# Patient Record
Sex: FEMALE | Race: WHITE | NOT HISPANIC OR LATINO | Employment: PART TIME | URBAN - METROPOLITAN AREA
[De-identification: names, ages, dates, MRNs, and addresses within clinical notes are randomized per-mention and may not be internally consistent; named-entity substitution may affect disease eponyms.]

---

## 2020-06-03 ENCOUNTER — OFFICE VISIT (OUTPATIENT)
Dept: URGENT CARE | Facility: PHYSICIAN GROUP | Age: 20
End: 2020-06-03
Payer: COMMERCIAL

## 2020-06-03 VITALS
HEIGHT: 65 IN | DIASTOLIC BLOOD PRESSURE: 64 MMHG | WEIGHT: 116 LBS | BODY MASS INDEX: 19.33 KG/M2 | HEART RATE: 80 BPM | SYSTOLIC BLOOD PRESSURE: 100 MMHG | OXYGEN SATURATION: 100 % | TEMPERATURE: 97 F

## 2020-06-03 DIAGNOSIS — N89.8 VAGINAL ITCHING: Primary | ICD-10-CM

## 2020-06-03 DIAGNOSIS — R10.2 PAIN IN VULVA: ICD-10-CM

## 2020-06-03 DIAGNOSIS — N94.10 DYSPAREUNIA IN FEMALE: ICD-10-CM

## 2020-06-03 PROCEDURE — 99203 OFFICE O/P NEW LOW 30 MIN: CPT | Performed by: PHYSICIAN ASSISTANT

## 2020-06-03 RX ORDER — ALPRAZOLAM 0.25 MG/1
0.25 TABLET ORAL NIGHTLY PRN
COMMUNITY

## 2020-06-03 RX ORDER — CLOBETASOL PROPIONATE 0.5 MG/G
CREAM TOPICAL
Qty: 1 TUBE | Refills: 0 | Status: SHIPPED | OUTPATIENT
Start: 2020-06-03 | End: 2020-09-17

## 2020-06-03 RX ORDER — FLUCONAZOLE 150 MG/1
150 TABLET ORAL DAILY
Qty: 1 TAB | Refills: 0 | Status: SHIPPED | OUTPATIENT
Start: 2020-06-03 | End: 2020-09-17

## 2020-06-03 ASSESSMENT — ENCOUNTER SYMPTOMS
DIARRHEA: 0
CONSTIPATION: 0
FLANK PAIN: 0
VAGINITIS: 1
NAUSEA: 0
CHILLS: 0
ANOREXIA: 0
ABDOMINAL PAIN: 0
FEVER: 0
BACK PAIN: 0
SORE THROAT: 0

## 2020-06-03 NOTE — PROGRESS NOTES
"Subjective:      Leeann Kamara is a 19 y.o. female who presents with Vaginal Swelling (possibly from a shaving laceration that happened in January. swelling started this morning)    Medications:    • ALPRAZolam Tabs    Allergies: Amoxicillin and Penicillins    Problem List: Leeann Kamara does not have a problem list on file.    Surgical History:  No past surgical history on file.    Past Social Hx: Leeann Kamara       Past Family Hx:  Leeann Kamara FHx not pertinent to today's complaint.       Problem list, medications, and allergies reviewed by myself today in Epic.      Patient presents with:  Vaginal Swelling, itching and dryness for \" a while\"  possibly from a shaving laceration that happened in January. Right labial swelling started this morning.  Pt states she is sexually active though not recently (last time was 3-4 weeks ago). Pt states right labia has been sore or \"not healed\" for 5 months, but denies scabs, bleeding, discharge, drainage.  PT states sex has always been painful/dry and taking OCP has made it worse so she is not currently taking them.  Pt denies any other complaint.         Vaginitis   The patient's primary symptoms include genital itching. The patient's pertinent negatives include no genital odor, genital rash, missed menses, pelvic pain, vaginal bleeding or vaginal discharge (not more than normal). This is a new problem. The current episode started more than 1 month ago. The problem occurs daily. The problem has been waxing and waning. The pain is moderate. The problem affects the right side. She is not pregnant. Associated symptoms include painful intercourse. Pertinent negatives include no abdominal pain, anorexia, back pain, chills, constipation, diarrhea, discolored urine, dysuria, fever, flank pain, frequency, hematuria, nausea, rash, sore throat or urgency. The symptoms are aggravated by intercourse and tactile pressure. She has tried nothing for the symptoms. The " "treatment provided no relief. She is sexually active. No, her partner does not have an STD. She uses condoms for contraception. Her menstrual history has been regular. Her past medical history is significant for ovarian cysts and vaginosis. There is no history of a gynecological surgery, herpes simplex, menorrhagia, metrorrhagia, PID or an STD.       Review of Systems   Constitutional: Negative for chills and fever.   HENT: Negative for sore throat.    Gastrointestinal: Negative for abdominal pain, anorexia, constipation, diarrhea and nausea.   Genitourinary: Negative for dysuria, flank pain, frequency, hematuria, menorrhagia, missed menses, pelvic pain, urgency and vaginal discharge (not more than normal).   Musculoskeletal: Negative for back pain.   Skin: Negative for rash.   All other systems reviewed and are negative.         Objective:     /64 (BP Location: Right arm, Patient Position: Sitting, BP Cuff Size: Adult)   Pulse 80   Temp 36.1 °C (97 °F)   Ht 1.638 m (5' 4.5\")   Wt 52.6 kg (116 lb)   SpO2 100%   BMI 19.60 kg/m²      Physical Exam  Vitals signs and nursing note reviewed. Chaperone present: declined chaperone.   Constitutional:       General: She is not in acute distress.     Appearance: Normal appearance. She is well-developed and normal weight. She is not toxic-appearing.   HENT:      Head: Normocephalic and atraumatic.      Nose: Nose normal.      Mouth/Throat:      Mouth: Mucous membranes are moist.   Eyes:      Extraocular Movements: Extraocular movements intact.      Conjunctiva/sclera: Conjunctivae normal.      Pupils: Pupils are equal, round, and reactive to light.   Neck:      Musculoskeletal: Normal range of motion and neck supple.   Cardiovascular:      Rate and Rhythm: Normal rate and regular rhythm.      Pulses: Normal pulses.      Heart sounds: Normal heart sounds.   Pulmonary:      Effort: Pulmonary effort is normal.      Breath sounds: Normal breath sounds.   Abdominal:      " General: Bowel sounds are normal.      Palpations: Abdomen is soft.      Tenderness: There is no guarding or rebound.   Genitourinary:     Exam position: Lithotomy position.      Pubic Area: No rash.       Labia:         Right: Tenderness present. No lesion or injury.       Urethra: No prolapse.      Vagina: No signs of injury. Vaginal discharge (slight white) and tenderness present. No erythema, bleeding or lesions.          Comments: Declined speculum exam at this time.   Musculoskeletal: Normal range of motion.   Lymphadenopathy:      Lower Body: No right inguinal adenopathy. No left inguinal adenopathy.   Skin:     General: Skin is warm and dry.      Capillary Refill: Capillary refill takes less than 2 seconds.   Neurological:      General: No focal deficit present.      Mental Status: She is alert and oriented to person, place, and time.      Gait: Gait normal.   Psychiatric:         Mood and Affect: Mood normal.         Behavior: Behavior is cooperative.                 Assessment/Plan:        1. Vaginal itching  clobetasol (TEMOVATE) 0.05 % Cream    fluconazole (DIFLUCAN) 150 MG tablet    REFERRAL TO GYNECOLOGY   2. Pain in vulva     3. Dyspareunia in female  clobetasol (TEMOVATE) 0.05 % Cream    fluconazole (DIFLUCAN) 150 MG tablet    REFERRAL TO GYNECOLOGY     Discussed ddx of lichen sclerosis, candida.  PT declined vag path or STD testing at this time. Would like to try cream first.  Rx for candida given if no improvement with cream after 2 weeks.     Referral to GYN for patient.     PT should follow up with PCP in 1-2 days for re-evaluation if symptoms have not improved.  Discussed red flags and reasons to return to UC or ED.  Pt and/or family verbalized understanding of diagnosis and follow up instructions and was offered informational handout on diagnosis.  PT discharged.

## 2020-06-03 NOTE — PATIENT INSTRUCTIONS
Lichen Sclerosus  Introduction  Lichen sclerosus is a skin problem. It can happen on any part of the body, but it commonly involves the anal or genital areas. It can cause itching and discomfort in these areas. Treatment can help to control symptoms. When the genital area is affected, getting treatment is important because the condition can cause scarring that may lead to other problems.  What are the causes?  The cause of this condition is not known. It could be the result of an overactive immune system or a lack of certain hormones. Lichen sclerosus is not an infection or a fungus. It is not passed from one person to another (not contagious).  What increases the risk?  This condition is more likely to develop in women, usually after menopause.  What are the signs or symptoms?  Symptoms of this condition include:  · Thin, wrinkled, white areas on the skin.  · Thickened white areas on the skin.  · Red and swollen patches (lesions) on the skin.  · Tears or cracks in the skin.  · Bruising.  · Blood blisters.  · Severe itching.  You may also have pain, itching, or burning with urination. Constipation is also common in people with lichen sclerosus.  How is this diagnosed?  This condition may be diagnosed with a physical exam. In some cases, a tissue sample (biopsy sample) may be removed to be looked at under a microscope.  How is this treated?  This condition is usually treated with medicated creams or ointments (topical steroids) that are applied over the affected areas.  Follow these instructions at home:  · Take over-the-counter and prescription medicines only as told by your health care provider.  · Use creams or ointments as told by your health care provider.  · Do not scratch the affected areas of skin.  · Women should keep the vaginal area as clean and dry as possible.  · Keep all follow-up visits as told by your health care provider. This is important.  Contact a health care provider if:  · You have increasing  redness, swelling, or pain in the affected area.  · You have fluid, blood, or pus coming from the affected area.  · You have new lesions on your skin.  · You have a fever.  · You have pain during sex.  This information is not intended to replace advice given to you by your health care provider. Make sure you discuss any questions you have with your health care provider.  Document Released: 05/09/2012 Document Revised: 05/25/2017 Document Reviewed: 03/14/2016  © 2017 [employer]        Bacterial Vaginosis  Bacterial vaginosis is a vaginal infection that occurs when the normal balance of bacteria in the vagina is disrupted. It results from an overgrowth of certain bacteria. This is the most common vaginal infection among women ages 15-44.  Because bacterial vaginosis increases your risk for STIs (sexually transmitted infections), getting treated can help reduce your risk for chlamydia, gonorrhea, herpes, and HIV (human immunodeficiency virus). Treatment is also important for preventing complications in pregnant women, because this condition can cause an early (premature) delivery.  What are the causes?  This condition is caused by an increase in harmful bacteria that are normally present in small amounts in the vagina. However, the reason that the condition develops is not fully understood.  What increases the risk?  The following factors may make you more likely to develop this condition:  · Having a new sexual partner or multiple sexual partners.  · Having unprotected sex.  · Douching.  · Having an intrauterine device (IUD).  · Smoking.  · Drug and alcohol abuse.  · Taking certain antibiotic medicines.  · Being pregnant.  You cannot get bacterial vaginosis from toilet seats, bedding, swimming pools, or contact with objects around you.  What are the signs or symptoms?  Symptoms of this condition include:  · Grey or white vaginal discharge. The discharge can also be watery or foamy.  · A fish-like odor with discharge,  especially after sexual intercourse or during menstruation.  · Itching in and around the vagina.  · Burning or pain with urination.  Some women with bacterial vaginosis have no signs or symptoms.  How is this diagnosed?  This condition is diagnosed based on:  · Your medical history.  · A physical exam of the vagina.  · Testing a sample of vaginal fluid under a microscope to look for a large amount of bad bacteria or abnormal cells. Your health care provider may use a cotton swab or a small wooden spatula to collect the sample.  How is this treated?  This condition is treated with antibiotics. These may be given as a pill, a vaginal cream, or a medicine that is put into the vagina (suppository). If the condition comes back after treatment, a second round of antibiotics may be needed.  Follow these instructions at home:  Medicines  · Take over-the-counter and prescription medicines only as told by your health care provider.  · Take or use your antibiotic as told by your health care provider. Do not stop taking or using the antibiotic even if you start to feel better.  General instructions  · If you have a female sexual partner, tell her that you have a vaginal infection. She should see her health care provider and be treated if she has symptoms. If you have a male sexual partner, he does not need treatment.  · During treatment:  ¨ Avoid sexual activity until you finish treatment.  ¨ Do not douche.  ¨ Avoid alcohol as directed by your health care provider.  ¨ Avoid breastfeeding as directed by your health care provider.  · Drink enough water and fluids to keep your urine clear or pale yellow.  · Keep the area around your vagina and rectum clean.  ¨ Wash the area daily with warm water.  ¨ Wipe yourself from front to back after using the toilet.  · Keep all follow-up visits as told by your health care provider. This is important.  How is this prevented?  · Do not douche.  · Wash the outside of your vagina with warm water  only.  · Use protection when having sex. This includes latex condoms and dental dams.  · Limit how many sexual partners you have. To help prevent bacterial vaginosis, it is best to have sex with just one partner (monogamous).  · Make sure you and your sexual partner are tested for STIs.  · Wear cotton or cotton-lined underwear.  · Avoid wearing tight pants and pantyhose, especially during summer.  · Limit the amount of alcohol that you drink.  · Do not use any products that contain nicotine or tobacco, such as cigarettes and e-cigarettes. If you need help quitting, ask your health care provider.  · Do not use illegal drugs.  Where to find more information:  · Centers for Disease Control and Prevention: www.cdc.gov/std  · American Sexual Health Association (MARISA): www.ashastd.org  · U.[place]. Department of Health and Human Services, Office on Women's Health: www.womenshealth.gov/ or https://www.womenshealth.gov/a-z-topics/bacterial-vaginosis  Contact a health care provider if:  · Your symptoms do not improve, even after treatment.  · You have more discharge or pain when urinating.  · You have a fever.  · You have pain in your abdomen.  · You have pain during sex.  · You have vaginal bleeding between periods.  Summary  · Bacterial vaginosis is a vaginal infection that occurs when the normal balance of bacteria in the vagina is disrupted.  · Because bacterial vaginosis increases your risk for STIs (sexually transmitted infections), getting treated can help reduce your risk for chlamydia, gonorrhea, herpes, and HIV (human immunodeficiency virus). Treatment is also important for preventing complications in pregnant women, because the condition can cause an early (premature) delivery.  · This condition is treated with antibiotic medicines. These may be given as a pill, a vaginal cream, or a medicine that is put into the vagina (suppository).  This information is not intended to replace advice given to you by your health care  provider. Make sure you discuss any questions you have with your health care provider.  Document Released: 12/18/2006 Document Revised: 09/02/2017 Document Reviewed: 09/02/2017  [employer] Interactive Patient Education © 2017 [employer]      Vaginal Yeast infection, Adult  Vaginal yeast infection is a condition that causes soreness, swelling, and redness (inflammation) of the vagina. It also causes vaginal discharge. This is a common condition. Some women get this infection frequently.  What are the causes?  This condition is caused by a change in the normal balance of the yeast (candida) and bacteria that live in the vagina. This change causes an overgrowth of yeast, which causes the inflammation.  What increases the risk?  This condition is more likely to develop in:  · Women who take antibiotic medicines.  · Women who have diabetes.  · Women who take birth control pills.  · Women who are pregnant.  · Women who douche often.  · Women who have a weak defense (immune) system.  · Women who have been taking steroid medicines for a long time.  · Women who frequently wear tight clothing.  What are the signs or symptoms?  Symptoms of this condition include:  · White, thick vaginal discharge.  · Swelling, itching, redness, and irritation of the vagina. The lips of the vagina (vulva) may be affected as well.  · Pain or a burning feeling while urinating.  · Pain during sex.  How is this diagnosed?  This condition is diagnosed with a medical history and physical exam. This will include a pelvic exam. Your health care provider will examine a sample of your vaginal discharge under a microscope. Your health care provider may send this sample for testing to confirm the diagnosis.  How is this treated?  This condition is treated with medicine. Medicines may be over-the-counter or prescription. You may be told to use one or more of the following:  · Medicine that is taken orally.  · Medicine that is applied as a  cream.  · Medicine that is inserted directly into the vagina (suppository).  Follow these instructions at home:  · Take or apply over-the-counter and prescription medicines only as told by your health care provider.  · Do not have sex until your health care provider has approved. Tell your sex partner that you have a yeast infection. That person should go to his or her health care provider if he or she develops symptoms.  · Do not wear tight clothes, such as pantyhose or tight pants.  · Avoid using tampons until your health care provider approves.  · Eat more yogurt. This may help to keep your yeast infection from returning.  · Try taking a sitz bath to help with discomfort. This is a warm water bath that is taken while you are sitting down. The water should only come up to your hips and should cover your buttocks. Do this 3-4 times per day or as told by your health care provider.  · Do not douche.  · Wear breathable, cotton underwear.  · If you have diabetes, keep your blood sugar levels under control.  Contact a health care provider if:  · You have a fever.  · Your symptoms go away and then return.  · Your symptoms do not get better with treatment.  · Your symptoms get worse.  · You have new symptoms.  · You develop blisters in or around your vagina.  · You have blood coming from your vagina and it is not your menstrual period.  · You develop pain in your abdomen.  This information is not intended to replace advice given to you by your health care provider. Make sure you discuss any questions you have with your health care provider.  Document Released: 09/27/2006 Document Revised: 05/31/2017 Document Reviewed: 06/20/2016  [employer] Interactive Patient Education © 2017 [employer]

## 2020-09-07 ENCOUNTER — OFFICE VISIT (OUTPATIENT)
Dept: URGENT CARE | Facility: PHYSICIAN GROUP | Age: 20
End: 2020-09-07
Payer: COMMERCIAL

## 2020-09-07 ENCOUNTER — HOSPITAL ENCOUNTER (OUTPATIENT)
Facility: MEDICAL CENTER | Age: 20
End: 2020-09-07
Attending: PHYSICIAN ASSISTANT
Payer: COMMERCIAL

## 2020-09-07 VITALS
OXYGEN SATURATION: 98 % | HEART RATE: 105 BPM | DIASTOLIC BLOOD PRESSURE: 70 MMHG | TEMPERATURE: 98.5 F | BODY MASS INDEX: 20.16 KG/M2 | SYSTOLIC BLOOD PRESSURE: 100 MMHG | WEIGHT: 121 LBS | HEIGHT: 65 IN

## 2020-09-07 DIAGNOSIS — B34.9 VIRAL ILLNESS: ICD-10-CM

## 2020-09-07 DIAGNOSIS — R05.9 COUGH: ICD-10-CM

## 2020-09-07 LAB
COVID ORDER STATUS COVID19: NORMAL
SARS-COV-2 RNA RESP QL NAA+PROBE: NOTDETECTED
SPECIMEN SOURCE: NORMAL

## 2020-09-07 PROCEDURE — 99213 OFFICE O/P EST LOW 20 MIN: CPT | Performed by: PHYSICIAN ASSISTANT

## 2020-09-07 PROCEDURE — U0003 INFECTIOUS AGENT DETECTION BY NUCLEIC ACID (DNA OR RNA); SEVERE ACUTE RESPIRATORY SYNDROME CORONAVIRUS 2 (SARS-COV-2) (CORONAVIRUS DISEASE [COVID-19]), AMPLIFIED PROBE TECHNIQUE, MAKING USE OF HIGH THROUGHPUT TECHNOLOGIES AS DESCRIBED BY CMS-2020-01-R: HCPCS

## 2020-09-07 SDOH — HEALTH STABILITY: MENTAL HEALTH: HOW OFTEN DO YOU HAVE A DRINK CONTAINING ALCOHOL?: NEVER

## 2020-09-07 ASSESSMENT — ENCOUNTER SYMPTOMS
MYALGIAS: 1
FEVER: 0
DIARRHEA: 0
WHEEZING: 0
HEADACHES: 0
HEMOPTYSIS: 0
SHORTNESS OF BREATH: 0
VOMITING: 0
SORE THROAT: 1
COUGH: 1
CHILLS: 0
SPUTUM PRODUCTION: 0
NAUSEA: 0
ABDOMINAL PAIN: 0

## 2020-09-07 NOTE — PROGRESS NOTES
Subjective:   Leeann Kamara is a 19 y.o. female who presents for Cough (Pt reports a dry cough, nasal congestion, body aches. Onset 3 days)      Cough  Associated symptoms include myalgias and a sore throat. Pertinent negatives include no chest pain, chills, fever, headaches, hemoptysis, rash, shortness of breath or wheezing.   Onset of symptoms 3 nights ago.   Gradually worsening.   101F oral thermometer.   Dry cough.   Nasal congestion.   Mild sore throat possibly from coughing.   Body aches.   Has not tried any medications for relief.   No tylenol or Ibuprofen today.   Denies any chills, headache, vision changes, chest pain, shortness of breath, wheezing, abdominal pain, n/v/d, loss of sense of taste or smell.     No known exposure to COVID-19.  History of untreated strep and scarlet fever. No history of significant allergies.       Review of Systems   Constitutional: Negative for chills, fever and malaise/fatigue.   HENT: Positive for sore throat.    Respiratory: Positive for cough. Negative for hemoptysis, sputum production, shortness of breath and wheezing.    Cardiovascular: Negative for chest pain.   Gastrointestinal: Negative for abdominal pain, diarrhea, nausea and vomiting.   Musculoskeletal: Positive for myalgias.   Skin: Negative for rash.   Neurological: Negative for headaches.       Medications:    • ALPRAZolam Tabs  • clobetasol Crea  • fluconazole    Allergies: Amoxicillin and Penicillins    Problem List: Leeann Kamara does not have a problem list on file.    Surgical History:  No past surgical history on file.    Past Social Hx: Leeann Kamara  reports that she has never smoked. She has never used smokeless tobacco. She reports that she does not drink alcohol or use drugs.     Past Family Hx:  Leeann Kamara family history is not on file.     Problem list, medications, and allergies reviewed by myself today in Epic.     Objective:     /70 (BP Location: Left arm, Patient  "Position: Sitting, BP Cuff Size: Adult)   Pulse (!) 105   Temp 36.9 °C (98.5 °F) (Temporal)   Ht 1.651 m (5' 5\")   Wt 54.9 kg (121 lb)   SpO2 98%   BMI 20.14 kg/m²     Physical Exam  Vitals signs reviewed.   Constitutional:       General: She is not in acute distress.     Appearance: Normal appearance. She is not ill-appearing or toxic-appearing.   HENT:      Head: Normocephalic.      Right Ear: Tympanic membrane, ear canal and external ear normal.      Left Ear: Tympanic membrane, ear canal and external ear normal.      Nose: Rhinorrhea present.      Mouth/Throat:      Mouth: Mucous membranes are moist.      Pharynx: Oropharynx is clear. No oropharyngeal exudate or posterior oropharyngeal erythema.   Eyes:      Conjunctiva/sclera: Conjunctivae normal.      Pupils: Pupils are equal, round, and reactive to light.   Cardiovascular:      Rate and Rhythm: Normal rate and regular rhythm.      Heart sounds: Normal heart sounds.   Pulmonary:      Effort: Pulmonary effort is normal. No respiratory distress.      Breath sounds: Normal breath sounds. No wheezing, rhonchi or rales.   Lymphadenopathy:      Cervical: No cervical adenopathy.   Skin:     General: Skin is warm and dry.   Neurological:      General: No focal deficit present.      Mental Status: She is alert and oriented to person, place, and time.   Psychiatric:         Mood and Affect: Mood normal.         Behavior: Behavior normal.         Assessment/Plan:     Diagnosis and associated orders:     1. Cough  COVID/SARS COV-2 PCR   2. Viral illness  COVID/SARS COV-2 PCR      Comments/MDM:     Discussed with patient signs and symptoms most likely are due to a viral etiology.   Test for COVID-19. We will call back for results and appropriate further instructions. Result will be released to D4P application.   Symptomatic and supportive care.  Plenty of oral hydration and rest   Over the counter cough suppressant as directed.  Tylenol or ibuprofen for pain and " fever as directed.   Saline nasal spray or Mucinex as a decongestant.  Infection control measures at home. Stay away from people, Hand washing, covering sneeze/cough, disinfect surfaces.   Remain home from work, school, and other populated environments. Work note provided with information of quarantine measures and CDC guidelines.   Discharge Instructions on COVID-19 and resources handed to patient.   Overall, the patient is well-appearing. Normal vital signs and benign examination. Normal lung examination. Suspicions for pneumonia are low. Therefore, antibiotics are not indicated at this time.          I confirmed the patient's mobile phone number, that their voicemail was set up, and received verbal consent to leave a voicemail if they do not answer the call.  The patient was amenable with this means of communication.    Red flags discussed and indications to immediately call 911 or present to the Emergency Department.   Supportive care, differential diagnoses, and indications for immediate follow-up discussed with patient.    Pathogenesis of diagnosis discussed including typical length and natural progression. Patient expresses understanding and agrees to plan.    Advised the patient to follow-up with the primary care physician for recheck, reevaluation, and consideration of further management.    Please note that this dictation was created using voice recognition software. I have made a reasonable attempt to correct obvious errors, but I expect that there are errors of grammar and possibly content that I did not discover before finalizing the note.    This note was electronically signed by Dillon Gallagher PA-C

## 2020-09-07 NOTE — PATIENT INSTRUCTIONS
INSTRUCTIONS FOR COVID-19 OR ANY OTHER INFECTIOUS RESPIRATORY ILLNESSES    The Centers for Disease Control and Prevention (CDC) states that early indications for COVID-19 include cough, shortness of breath, difficulty breathing, or at least two of the following symptoms: chills, shaking with chills, muscle pain, headache, sore throat, and loss of taste or smell. Symptoms can range from mild to severe and may appear up to two weeks after exposure to the virus.    The practice of self-isolation and quarantine helps protect the public and your family by  preventing exposure to people who have or may have a contagious disease. Please follow the prevention steps below as based on CDC guidelines:    WHEN TO STOP ISOLATION: Persons with COVID-19 or any other infectious respiratory illness who have symptoms and were advised to care for themselves at home may discontinue home isolation under the following conditions:  · At least 24 hours have passed since recovery defined as resolution of fever without the use of fever-reducing medications; AND,  · Improvement in respiratory symptoms (e.g., cough, shortness of breath); AND,  · At least 10 days have passed since symptoms first appeared and have had no subsequent illness.    MONITOR YOUR SYMPTOMS: If your illness is worsening, seek prompt medical attention. If you have a medical emergency and need to call 911, notify the dispatch personnel that you have, or are being evaluated for confirmed or suspected COVID-19 or another infectious respiratory illness. Wear a facemask if possible.    ACTIVITY RESTRICTION: restrict activities outside your home, except for getting medical care. Do not go to work, school, or public areas. Avoid using public transportation, ride-sharing, or taxis.    SCHEDULED MEDICAL APPOINTMENTS: Notify your provider that you have, or are being evaluated for, confirmed or suspected COVID-19 or another infectious respiratory. This will help the healthcare  provider’s office safely take care of you and keep other people from getting exposed or infected.    FACEMASKS, when to wear: Anytime you are away from your home or around other people or pets. If you are unable to wear one, maintain a minimum of 6 feet distancing from others.    LIVING ENVIRONMENT: Stay in a separate room from other people and pets. If possible, use a separate bathroom, have someone else care for your pets and avoid sharing household items. Any items used should be washed thoroughly with soap and water. Clean all “high-touch” surfaces every day. Use a household cleaning spray or wipe, according to the label instructions. High touch surfaces include (but are not limited to) counters, tabletops, doorknobs, bathroom fixtures, toilets, phones, keyboards, tablets, and bedside tables.     HAND WASHING: Frequently wash hands with soap and water for at least 20 seconds,  especially after blowing your nose, coughing, or sneezing; going to the bathroom; before and after interacting with pets; and before and after eating or preparing food. If hands are visibly dirty use soap and water. If soap and water are not available, use an alcohol-based hand  with at least 60% alcohol. Avoid touching your eyes, nose, and mouth with unwashed hands. Cover your coughs and sneezes with a tissue. Throw used tissues in a lined trash can. Immediately wash your hands.    ACTIVE/FACILITATED SELF-MONITORING: Follow instructions provided by your local health department or health professionals, as appropriate. When working with your local health department check their available hours.    [facility]   Phone Number   [facility] [phone]   [facility] [place] [phone]   [facility] [address]   [place] [phone]     IF YOU HAVE CONFIRMED POSITIVE COVID-19:    Those who have completely recovered from COVID-19 may have immune-boosting antibodies in their plasma--called “convalescent plasma”--that could be  used to treat critically ill COVID19 patients.    [facility] is excited to begin working with [place] on collecting convalescent plasma from  people who have recovered from COVID-19 as part of a program to treat patients infected with the virus. This FDA-approved “emergency investigational new drug” is a special blood product containing antibodies that may give patients an extra boost to fight the virus.    To be eligible to donate convalescent plasma, you must have a prior COVID-19 diagnosis documented by a laboratory test (or a positive test result for SARS-CoV-2 antibodies) and meet additional eligibility requirements.    If you are interested in donating convalescent plasma or have any additional questions, please contact the [facility] Convalescent Plasma  at [phone] or via e-mail at [facility] [place], [email or web address].

## 2020-09-12 ENCOUNTER — TELEPHONE (OUTPATIENT)
Dept: URGENT CARE | Facility: PHYSICIAN GROUP | Age: 20
End: 2020-09-12

## 2020-09-17 ENCOUNTER — OFFICE VISIT (OUTPATIENT)
Dept: URGENT CARE | Facility: PHYSICIAN GROUP | Age: 20
End: 2020-09-17
Payer: COMMERCIAL

## 2020-09-17 ENCOUNTER — APPOINTMENT (OUTPATIENT)
Dept: RADIOLOGY | Facility: IMAGING CENTER | Age: 20
End: 2020-09-17
Attending: NURSE PRACTITIONER
Payer: COMMERCIAL

## 2020-09-17 VITALS
HEIGHT: 65 IN | TEMPERATURE: 98.1 F | WEIGHT: 122 LBS | OXYGEN SATURATION: 98 % | RESPIRATION RATE: 16 BRPM | DIASTOLIC BLOOD PRESSURE: 60 MMHG | HEART RATE: 86 BPM | BODY MASS INDEX: 20.33 KG/M2 | SYSTOLIC BLOOD PRESSURE: 106 MMHG

## 2020-09-17 DIAGNOSIS — R05.9 COUGH: ICD-10-CM

## 2020-09-17 PROCEDURE — 71046 X-RAY EXAM CHEST 2 VIEWS: CPT | Mod: TC | Performed by: NURSE PRACTITIONER

## 2020-09-17 PROCEDURE — 99214 OFFICE O/P EST MOD 30 MIN: CPT | Performed by: NURSE PRACTITIONER

## 2020-09-17 NOTE — LETTER
September 17, 2020         Patient: Leeann Kamara   YOB: 2000   Date of Visit: 9/17/2020           To Whom it May Concern:    Leeann Kamara was seen in my clinic on 9/17/2020. She continues to have symptoms to include cough, therefore, would benefit from continued home isolation.    If you have any questions or concerns, please don't hesitate to call.        Sincerely,           IRIS Levin.  Electronically Signed

## 2020-09-18 ASSESSMENT — ENCOUNTER SYMPTOMS
NAUSEA: 0
SHORTNESS OF BREATH: 0
FEVER: 0
COUGH: 1
PALPITATIONS: 0
CHILLS: 0
ABDOMINAL PAIN: 0
HEADACHES: 1
WEIGHT LOSS: 0
VOMITING: 0
SINUS PAIN: 0
SORE THROAT: 1
MYALGIAS: 0

## 2020-09-18 NOTE — PROGRESS NOTES
Subjective:   Leeann Kamara is a 19 y.o. female who presents for Cough (barking dry cough, not getting better  x2 weeks )       Cough  This is a recurrent problem. The current episode started 1 to 4 weeks ago. The problem has been unchanged. The problem occurs every few minutes. The cough is productive of sputum. Associated symptoms include headaches and a sore throat. Pertinent negatives include no chest pain, chills, ear congestion, ear pain, fever, myalgias, nasal congestion, postnasal drip, shortness of breath or weight loss. Exacerbated by: unsure. She has tried a beta-agonist inhaler and OTC cough suppressant for the symptoms. The treatment provided no relief. Her past medical history is significant for environmental allergies.     Pt presents for evaluation of a new problem, reports an overall dry cough, with occasional productive cough (a few times a day) over the past 2 weeks.  Has been unable to work due to cough, and has had no ill contacts.  She was given an albuterol inhaler which has not helped alleviate her cough.  Takes a Claritin daily.  Has had difficulty sleeping due to constant cough.  Denies additional constitutional symptoms.     Review of Systems   Constitutional: Positive for malaise/fatigue. Negative for chills, fever and weight loss.   HENT: Positive for sore throat. Negative for congestion, ear pain, postnasal drip and sinus pain.    Respiratory: Positive for cough. Negative for shortness of breath.    Cardiovascular: Negative for chest pain and palpitations.   Gastrointestinal: Negative for abdominal pain, nausea and vomiting.   Musculoskeletal: Negative for myalgias.   Neurological: Positive for headaches.   Endo/Heme/Allergies: Positive for environmental allergies.   All other systems reviewed and are negative.      MEDS:   Current Outpatient Medications:   •  Hydrocod Polst-CPM Polst ER (TUSSIONEX PENNKINETIC ER) 10-8 MG/5ML Suspension Extended Release, Take 5 mL by mouth every 12  "hours for 7 days., Disp: 70 mL, Rfl: 0  •  ALPRAZolam (XANAX) 0.25 MG Tab, Take 0.25 mg by mouth at bedtime as needed for Sleep., Disp: , Rfl:   ALLERGIES:   Allergies   Allergen Reactions   • Amoxicillin Rash     rash   • Penicillins Rash     rash       Patient's PMH, SocHx, SurgHx, FamHx, Drug allergies and medications were reviewed.     Objective:   /60 (BP Location: Right arm, Patient Position: Sitting, BP Cuff Size: Adult)   Pulse 86   Temp 36.7 °C (98.1 °F) (Temporal)   Resp 16   Ht 1.651 m (5' 5\")   Wt 55.3 kg (122 lb)   SpO2 98%   BMI 20.30 kg/m²     Physical Exam  Vitals signs and nursing note reviewed.   Constitutional:       General: She is awake.      Appearance: Normal appearance. She is well-developed.   HENT:      Head: Normocephalic and atraumatic.      Right Ear: Tympanic membrane, ear canal and external ear normal.      Left Ear: Tympanic membrane, ear canal and external ear normal.      Nose: Nose normal. No nasal tenderness, mucosal edema, congestion or rhinorrhea.      Right Turbinates: Enlarged.      Left Turbinates: Enlarged.      Mouth/Throat:      Lips: Pink.      Mouth: Mucous membranes are moist.      Pharynx: Oropharynx is clear. Uvula midline. Posterior oropharyngeal erythema present.   Eyes:      Extraocular Movements: Extraocular movements intact.      Conjunctiva/sclera: Conjunctivae normal.   Neck:      Musculoskeletal: Normal range of motion and neck supple.   Cardiovascular:      Rate and Rhythm: Normal rate and regular rhythm.      Pulses: Normal pulses.      Heart sounds: Normal heart sounds.   Pulmonary:      Effort: Pulmonary effort is normal.      Breath sounds: Normal breath sounds.   Musculoskeletal: Normal range of motion.   Skin:     General: Skin is warm and dry.   Neurological:      General: No focal deficit present.      Mental Status: She is alert and oriented to person, place, and time.   Psychiatric:         Mood and Affect: Mood normal.         " Behavior: Behavior normal. Behavior is cooperative.         Thought Content: Thought content normal.         Judgment: Judgment normal.         Assessment/Plan:   Assessment    1. Cough  - DX-CHEST-2 VIEWS; Future  - Hydrocod Polst-CPM Polst ER (TUSSIONEX PENNKINETIC ER) 10-8 MG/5ML Suspension Extended Release; Take 5 mL by mouth every 12 hours for 7 days.  Dispense: 70 mL; Refill: 0      Reviewed today's test results that were completed in the clinic (see chart for details).  Begin medications as listed on a PRN basis.  Supportive care options also discussed, including the possibility of a post infectious cough.  Discussed the use of OTC medications as per package directions on a PRN basis., trial change to zyrtec once daily.  Differential diagnosis, natural history, and indications for immediate follow-up were discussed.     Return to urgent care clinic or PCP if current symptoms do not improve and/or worsening symptoms occur. Advised of signs and symptoms which would warrant further evaluation and /or emergent evaluation in ER.  All questions answered and the patient agrees to the plan of care.     No follow-ups on file.

## 2020-11-16 ENCOUNTER — OFFICE VISIT (OUTPATIENT)
Dept: URGENT CARE | Facility: PHYSICIAN GROUP | Age: 20
End: 2020-11-16
Payer: COMMERCIAL

## 2020-11-16 VITALS
WEIGHT: 122.8 LBS | HEIGHT: 65 IN | SYSTOLIC BLOOD PRESSURE: 90 MMHG | DIASTOLIC BLOOD PRESSURE: 58 MMHG | HEART RATE: 79 BPM | TEMPERATURE: 98.3 F | OXYGEN SATURATION: 99 % | RESPIRATION RATE: 16 BRPM | BODY MASS INDEX: 20.46 KG/M2

## 2020-11-16 DIAGNOSIS — R09.81 NASAL CONGESTION WITH RHINORRHEA: ICD-10-CM

## 2020-11-16 DIAGNOSIS — J02.9 SORE THROAT: ICD-10-CM

## 2020-11-16 DIAGNOSIS — J34.89 NASAL CONGESTION WITH RHINORRHEA: ICD-10-CM

## 2020-11-16 LAB
INT CON NEG: NORMAL
INT CON POS: NORMAL
S PYO AG THROAT QL: NEGATIVE

## 2020-11-16 PROCEDURE — 99213 OFFICE O/P EST LOW 20 MIN: CPT | Performed by: NURSE PRACTITIONER

## 2020-11-16 PROCEDURE — 87880 STREP A ASSAY W/OPTIC: CPT | Performed by: NURSE PRACTITIONER

## 2020-11-16 RX ORDER — PREDNISONE 20 MG/1
TABLET ORAL
COMMUNITY
Start: 2020-09-17

## 2020-11-16 RX ORDER — LIDOCAINE HYDROCHLORIDE 20 MG/ML
15 SOLUTION OROPHARYNGEAL 4 TIMES DAILY PRN
Qty: 300 ML | Refills: 0 | Status: SHIPPED | OUTPATIENT
Start: 2020-11-16 | End: 2020-11-21

## 2020-11-16 RX ORDER — AZITHROMYCIN 250 MG/1
TABLET, FILM COATED ORAL
COMMUNITY
Start: 2020-10-01

## 2020-11-16 RX ORDER — BENZONATATE 100 MG/1
CAPSULE ORAL
COMMUNITY
Start: 2020-09-17

## 2020-11-16 RX ORDER — ALBUTEROL SULFATE 90 UG/1
AEROSOL, METERED RESPIRATORY (INHALATION)
COMMUNITY
Start: 2020-09-12

## 2020-11-16 ASSESSMENT — ENCOUNTER SYMPTOMS
SORE THROAT: 1
SHORTNESS OF BREATH: 0
HEADACHES: 0
EYE REDNESS: 0
NECK PAIN: 0
CHILLS: 0
EYE DISCHARGE: 0
WHEEZING: 0
VOMITING: 0
NAUSEA: 0
MYALGIAS: 0
FEVER: 0
DIZZINESS: 0
WEAKNESS: 0
COUGH: 0
ABDOMINAL PAIN: 0
DIARRHEA: 0
CONSTIPATION: 0

## 2020-11-16 NOTE — PROGRESS NOTES
liSubjective:      Leeann Kamara is a 19 y.o. female who presents with Sore Throat (painful swallowing, redness, x3 days )            HPI  C/o sore throat x 3 days. Hurts to swallow. Last COVID test 9/2020 with negative results. Mild nasal congestion with PND. Denies facial sinus pain, no ear pain, no cough, fever or body aches. No known exposure to COVID or other respiratory illness.    PMH:  has no past medical history on file.  MEDS:   Current Outpatient Medications:   •  ALPRAZolam (XANAX) 0.25 MG Tab, Take 0.25 mg by mouth at bedtime as needed for Sleep., Disp: , Rfl:   •  predniSONE (DELTASONE) 20 MG Tab, TAKE 1 TABLET BY MOUTH EVERY DAY FOR 5 DAYS, Disp: , Rfl:   •  benzonatate (TESSALON) 100 MG Cap, TAKE 1 CAPSULE 3 TIMES A DAY UNTIL DIRECTED TO STOP, Disp: , Rfl:   •  azithromycin (ZITHROMAX) 250 MG Tab, TAKE 2 TABLETS BY MOUTH TODAY, THEN TAKE 1 TABLET DAILY FOR 4 DAYS, Disp: , Rfl:   •  albuterol 108 (90 Base) MCG/ACT Aero Soln inhalation aerosol, INHALE 2 PUFFS BY MOUTH EVERY 4 HOURS UNTIL DIRECTED TO STOP. USE AS NEEDED FOR WHEEZING, Disp: , Rfl:   ALLERGIES:   Allergies   Allergen Reactions   • Amoxicillin Rash     rash   • Penicillins Rash     rash     SURGHX: History reviewed. No pertinent surgical history.  SOCHX:  reports that she has never smoked. She has never used smokeless tobacco. She reports that she does not drink alcohol or use drugs.  FH: Family history was reviewed, no pertinent findings to report    Review of Systems   Constitutional: Negative for chills, fever and malaise/fatigue.   HENT: Positive for congestion and sore throat. Negative for ear pain.    Eyes: Negative for discharge and redness.   Respiratory: Negative for cough, shortness of breath and wheezing.    Gastrointestinal: Negative for abdominal pain, constipation, diarrhea, nausea and vomiting.   Musculoskeletal: Negative for myalgias and neck pain.   Skin: Negative for itching and rash.   Neurological: Negative for  "dizziness, weakness and headaches.   Endo/Heme/Allergies: Positive for environmental allergies.   All other systems reviewed and are negative.         Objective:     BP (!) 90/58 (BP Location: Right arm, Patient Position: Sitting, BP Cuff Size: Adult)   Pulse 79   Temp 36.8 °C (98.3 °F) (Temporal)   Resp 16   Ht 1.651 m (5' 5\")   Wt 55.7 kg (122 lb 12.8 oz)   SpO2 99%   BMI 20.43 kg/m²      Physical Exam  Vitals signs reviewed.   Constitutional:       General: She is awake. She is not in acute distress.     Appearance: Normal appearance. She is well-developed. She is not ill-appearing, toxic-appearing or diaphoretic.   HENT:      Head: Normocephalic.      Nose: Congestion and rhinorrhea present.      Mouth/Throat:      Mouth: Mucous membranes are dry.      Pharynx: Oropharynx is clear. Uvula midline. Posterior oropharyngeal erythema present. No pharyngeal swelling, oropharyngeal exudate or uvula swelling.   Eyes:      Conjunctiva/sclera: Conjunctivae normal.      Pupils: Pupils are equal, round, and reactive to light.   Neck:      Musculoskeletal: Normal range of motion and neck supple.   Cardiovascular:      Rate and Rhythm: Normal rate.   Pulmonary:      Effort: Pulmonary effort is normal. No tachypnea, accessory muscle usage or respiratory distress.   Musculoskeletal: Normal range of motion.   Skin:     General: Skin is warm and dry.   Neurological:      Mental Status: She is alert and oriented to person, place, and time.      GCS: GCS eye subscore is 4. GCS verbal subscore is 5. GCS motor subscore is 6.   Psychiatric:         Attention and Perception: Attention and perception normal.         Mood and Affect: Mood and affect normal.         Speech: Speech normal.         Behavior: Behavior normal. Behavior is cooperative.               Provider wore N95 and/or surgical mask, eye goggles and/or gloves with hand sanitization during exam.    Assessment/Plan:        1. Sore throat    - POCT Rapid Strep A: " NEG    2. Nasal congestion with rhinorrhea    Increase water intake  Get rest  May use Ibuprofen/Tylenol prn for any fever, body aches or throat pain  May take longer acting antihistamine for seasonal allergy symptoms prn  May use saline nasal spray for nasal congestion  May use Flonase or Nasocort for allergen nasal congestion  May gargle with salt water prn for throat discomfort  May drink smoothies for nutrition if too painful to swallow solid foods  Monitor for productive cough, SOB and chest pain/tightness- need re-evaluation

## 2024-09-05 ENCOUNTER — OFFICE VISIT (OUTPATIENT)
Dept: URGENT CARE | Facility: CLINIC | Age: 24
End: 2024-09-05
Payer: COMMERCIAL

## 2024-09-05 VITALS
SYSTOLIC BLOOD PRESSURE: 110 MMHG | HEART RATE: 98 BPM | WEIGHT: 130.3 LBS | HEIGHT: 64 IN | RESPIRATION RATE: 13 BRPM | BODY MASS INDEX: 22.24 KG/M2 | DIASTOLIC BLOOD PRESSURE: 66 MMHG | TEMPERATURE: 97.8 F | OXYGEN SATURATION: 95 %

## 2024-09-05 DIAGNOSIS — J01.00 ACUTE NON-RECURRENT MAXILLARY SINUSITIS: ICD-10-CM

## 2024-09-05 PROCEDURE — 3074F SYST BP LT 130 MM HG: CPT | Performed by: PHYSICIAN ASSISTANT

## 2024-09-05 PROCEDURE — 99203 OFFICE O/P NEW LOW 30 MIN: CPT | Performed by: PHYSICIAN ASSISTANT

## 2024-09-05 PROCEDURE — 3078F DIAST BP <80 MM HG: CPT | Performed by: PHYSICIAN ASSISTANT

## 2024-09-05 RX ORDER — DOXYCYCLINE HYCLATE 100 MG
100 TABLET ORAL 2 TIMES DAILY
Qty: 14 TABLET | Refills: 0 | Status: SHIPPED | OUTPATIENT
Start: 2024-09-05

## 2024-09-05 RX ORDER — FLUTICASONE PROPIONATE 50 MCG
1 SPRAY, SUSPENSION (ML) NASAL DAILY
Qty: 16 G | Refills: 0 | Status: SHIPPED | OUTPATIENT
Start: 2024-09-05

## 2024-09-05 RX ORDER — METHYLPREDNISOLONE 4 MG
TABLET, DOSE PACK ORAL
Qty: 21 TABLET | Refills: 0 | Status: SHIPPED | OUTPATIENT
Start: 2024-09-05

## 2024-09-05 ASSESSMENT — ENCOUNTER SYMPTOMS
SHORTNESS OF BREATH: 0
FEVER: 0
CARDIOVASCULAR NEGATIVE: 1
SINUS PAIN: 1
CHILLS: 0
MUSCULOSKELETAL NEGATIVE: 1
SWOLLEN GLANDS: 0
COUGH: 0
HEADACHES: 1
GASTROINTESTINAL NEGATIVE: 1
SINUS PRESSURE: 1
SORE THROAT: 0

## 2024-09-05 NOTE — PROGRESS NOTES
"Subjective     Leeann Kamara is a very pleasant 23 y.o. female who presents with Sinus Problem (Pt has a cough, sinus pressure, nasal congestion, runny nose x 10 days )            Sinus Problem  This is a new problem. The current episode started 1 to 4 weeks ago. The problem has been gradually worsening since onset. There has been no fever. The fever has been present for Less than 1 day. Associated symptoms include congestion, headaches, sinus pressure and sneezing. Pertinent negatives include no chills, coughing, ear pain, shortness of breath, sore throat or swollen glands. Past treatments include oral decongestants, saline nose sprays and nasal decongestants. The treatment provided mild relief.       PMH:  has no past medical history on file.  MEDS:   Current Outpatient Medications:     ALPRAZolam (XANAX) 0.25 MG Tab, Take 0.25 mg by mouth at bedtime as needed for Sleep., Disp: , Rfl:   ALLERGIES:   Allergies   Allergen Reactions    Amoxicillin Rash     rash    Penicillins Rash     rash     SURGHX: No past surgical history on file.  SOCHX:  reports that she has never smoked. She has never used smokeless tobacco. She reports that she does not drink alcohol and does not use drugs.  FH: family history is not on file.      Review of Systems   Constitutional:  Negative for chills and fever.   HENT:  Positive for congestion, sinus pressure, sinus pain and sneezing. Negative for ear pain and sore throat.    Respiratory:  Negative for cough and shortness of breath.    Cardiovascular: Negative.    Gastrointestinal: Negative.    Musculoskeletal: Negative.    Neurological:  Positive for headaches.       Medications, Allergies, and current problem list reviewed today in Epic           Objective     /66 (BP Location: Left arm, Patient Position: Sitting, BP Cuff Size: Adult)   Pulse 98   Temp 36.6 °C (97.8 °F) (Temporal)   Resp 13   Ht 1.626 m (5' 4\")   Wt 59.1 kg (130 lb 4.8 oz)   SpO2 95%   BMI 22.37 kg/m²  "     Physical Exam  Vitals and nursing note reviewed.   Constitutional:       General: She is not in acute distress.     Appearance: Normal appearance. She is well-developed. She is not ill-appearing, toxic-appearing or diaphoretic.   HENT:      Head: Normocephalic and atraumatic.      Right Ear: Hearing, tympanic membrane, ear canal and external ear normal. No decreased hearing noted. Tympanic membrane is not erythematous.      Left Ear: Hearing, tympanic membrane, ear canal and external ear normal. No decreased hearing noted. Tympanic membrane is not erythematous.      Nose: Mucosal edema, congestion and rhinorrhea present. Rhinorrhea is purulent.      Right Turbinates: Swollen.      Left Turbinates: Swollen.      Right Sinus: Maxillary sinus tenderness present.      Left Sinus: Maxillary sinus tenderness present.      Mouth/Throat:      Mouth: Mucous membranes are moist.      Dentition: Normal dentition.      Pharynx: Posterior oropharyngeal erythema present. No oropharyngeal exudate.   Eyes:      General: No scleral icterus.        Right eye: No discharge.         Left eye: No discharge.      Conjunctiva/sclera: Conjunctivae normal.   Cardiovascular:      Rate and Rhythm: Normal rate and regular rhythm.      Pulses: Normal pulses.      Heart sounds: Normal heart sounds. No murmur heard.  Pulmonary:      Effort: Pulmonary effort is normal. No respiratory distress.      Breath sounds: Normal breath sounds. No wheezing, rhonchi or rales.   Musculoskeletal:      Cervical back: Normal range of motion and neck supple.   Lymphadenopathy:      Cervical: No cervical adenopathy.   Skin:     General: Skin is warm and dry.      Nails: There is no clubbing.   Neurological:      General: No focal deficit present.      Mental Status: She is alert and oriented to person, place, and time. Mental status is at baseline.   Psychiatric:         Mood and Affect: Mood normal.         Behavior: Behavior normal.         Thought Content:  Thought content normal.         Judgment: Judgment normal.                             Assessment & Plan        Assessment & Plan  Acute non-recurrent maxillary sinusitis  Take full course of antibiotic  Tylenol and Motrin for fever and pain  OTC meds as discussed including oral decongestant if tolerated  Increase fluids and rest  Nasal spray, nasal rinse/wash, rachael pot      Orders:    methylPREDNISolone (MEDROL DOSEPAK) 4 MG Tablet Therapy Pack; Follow schedule on package instructions.    fluticasone (FLONASE) 50 MCG/ACT nasal spray; Administer 1 Spray into affected nostril(S) every day.    doxycycline (VIBRAMYCIN) 100 MG Tab; Take 1 Tablet by mouth 2 times a day.              I personally reviewed prior external notes and test results pertinent to today's visit. Return to clinic or go to ED if symptoms worsen or persist. Red flag symptoms and indications for ED discussed at length. Patient/Parent/Guardian voices understanding.  AVS with post-visit instructions printed and provided or given verbally.  Follow-up with your primary care provider in 3-5 days. All side effects and potential interactions of prescribed medication discussed including allergic response, GI upset, tendon injury, rash, sedation, OCP effectiveness, etc.    Please note that this dictation was created using voice recognition software. I have made every reasonable attempt to correct obvious errors, but I expect that there are errors of grammar and possibly content that I did not discover before finalizing the note.

## 2024-11-21 ENCOUNTER — OFFICE VISIT (OUTPATIENT)
Dept: URGENT CARE | Facility: CLINIC | Age: 24
End: 2024-11-21
Payer: COMMERCIAL

## 2024-11-21 VITALS
WEIGHT: 130 LBS | BODY MASS INDEX: 22.2 KG/M2 | HEART RATE: 78 BPM | DIASTOLIC BLOOD PRESSURE: 62 MMHG | SYSTOLIC BLOOD PRESSURE: 110 MMHG | OXYGEN SATURATION: 99 % | TEMPERATURE: 99.4 F | RESPIRATION RATE: 16 BRPM | HEIGHT: 64 IN

## 2024-11-21 DIAGNOSIS — J02.9 PHARYNGITIS, UNSPECIFIED ETIOLOGY: ICD-10-CM

## 2024-11-21 DIAGNOSIS — B33.8 RSV INFECTION: ICD-10-CM

## 2024-11-21 LAB
FLUAV RNA SPEC QL NAA+PROBE: NEGATIVE
FLUBV RNA SPEC QL NAA+PROBE: NEGATIVE
RSV RNA SPEC QL NAA+PROBE: POSITIVE
SARS-COV-2 RNA RESP QL NAA+PROBE: NEGATIVE

## 2024-11-21 PROCEDURE — 99213 OFFICE O/P EST LOW 20 MIN: CPT

## 2024-11-21 PROCEDURE — 3078F DIAST BP <80 MM HG: CPT

## 2024-11-21 PROCEDURE — 3074F SYST BP LT 130 MM HG: CPT

## 2024-11-21 PROCEDURE — 0241U POCT CEPHEID COV-2, FLU A/B, RSV - PCR: CPT

## 2024-11-21 RX ORDER — PHENIRAMINE/PHENYLEPHRINE/DM 20-10-20MG
POWDER IN PACKET (EA) ORAL
COMMUNITY

## 2024-11-21 RX ORDER — BENZONATATE 100 MG/1
100 CAPSULE ORAL 3 TIMES DAILY PRN
Qty: 60 CAPSULE | Refills: 0 | Status: SHIPPED | OUTPATIENT
Start: 2024-11-21

## 2024-11-21 ASSESSMENT — ENCOUNTER SYMPTOMS
COUGH: 1
SORE THROAT: 1
FEVER: 0

## 2024-11-21 NOTE — PROGRESS NOTES
Subjective:     CHIEF COMPLAINT  Chief Complaint   Patient presents with    Nasal Congestion     X 2 days, swollen tonsils, nasal burning sensation, had nasal congestion/blocked, cough, sore from coughing        HPI  Leeann Kamara is a very pleasant 23 y.o. female who presents with a sore throat, nasal congestion, sinus pressure, and a cough that has been present for the past 2 days.  She reports that the cough developed today.  She has tried performing sinus flushes but has had difficulty secondary to congestion.  She has not had any known sick contacts.  She has not had any fevers.  She does not feel that her symptoms are secondary to strep pharyngitis.    REVIEW OF SYSTEMS  Review of Systems   Constitutional:  Negative for fever.   HENT:  Positive for congestion and sore throat.    Respiratory:  Positive for cough.        PAST MEDICAL HISTORY  There are no active problems to display for this patient.      SURGICAL HISTORY  patient denies any surgical history    ALLERGIES  Allergies   Allergen Reactions    Amoxicillin Rash     rash    Penicillins Rash     rash       CURRENT MEDICATIONS  Home Medications       Reviewed by Lona Mcmanus P.A.-C. (Physician Assistant) on 11/21/24 at 1538  Med List Status: <None>     Medication Last Dose Status   ALPRAZolam (XANAX) 0.25 MG Tab PRN Active   doxycycline (VIBRAMYCIN) 100 MG Tab Not Taking Active   fluticasone (FLONASE) 50 MCG/ACT nasal spray PRN Active   methylPREDNISolone (MEDROL DOSEPAK) 4 MG Tablet Therapy Pack Not Taking Active   Phenylephrine-Pheniramine-DM (THERAFLU COLD & COUGH) 10-20-20 MG Pack PRN Active   Pseudoephedrine-APAP-DM (DAYQUIL PO) PRN Active                    SOCIAL HISTORY  Social History     Tobacco Use    Smoking status: Never    Smokeless tobacco: Never   Vaping Use    Vaping status: Former    Quit date: 5/7/2020   Substance and Sexual Activity    Alcohol use: Never    Drug use: Never    Sexual activity: Yes     Partners: Male      "Birth control/protection: Condom       FAMILY HISTORY  History reviewed. No pertinent family history.       Objective:     VITAL SIGNS: /62 (BP Location: Left arm, Patient Position: Sitting, BP Cuff Size: Adult)   Pulse 78   Temp 37.4 °C (99.4 °F) (Temporal)   Resp 16   Ht 1.626 m (5' 4\")   Wt 59 kg (130 lb)   SpO2 99%   BMI 22.31 kg/m²     PHYSICAL EXAM  Physical Exam  Vitals reviewed.   Constitutional:       General: She is not in acute distress.     Appearance: Normal appearance. She is not ill-appearing or toxic-appearing.   HENT:      Head: Normocephalic and atraumatic.      Right Ear: Tympanic membrane, ear canal and external ear normal.      Left Ear: Tympanic membrane, ear canal and external ear normal.      Nose: Congestion present.      Mouth/Throat:      Mouth: Mucous membranes are moist.      Pharynx: Uvula midline. Pharyngeal swelling, posterior oropharyngeal erythema and postnasal drip present. No oropharyngeal exudate.      Tonsils: 2+ on the right. 2+ on the left.   Eyes:      Conjunctiva/sclera: Conjunctivae normal.      Pupils: Pupils are equal, round, and reactive to light.   Cardiovascular:      Rate and Rhythm: Normal rate and regular rhythm.      Heart sounds: Normal heart sounds.   Pulmonary:      Effort: Pulmonary effort is normal. No respiratory distress.      Breath sounds: Normal breath sounds. No stridor. No wheezing, rhonchi or rales.   Skin:     General: Skin is warm and dry.      Coloration: Skin is not pale.   Neurological:      General: No focal deficit present.      Mental Status: She is alert and oriented to person, place, and time.   Psychiatric:         Mood and Affect: Mood normal.         Assessment/Plan:     1. RSV infection  - POCT CoV-2, Flu A/B, RSV by PCR  - benzonatate (TESSALON) 100 MG Cap; Take 1 Capsule by mouth 3 times a day as needed for Cough.  Dispense: 60 Capsule; Refill: 0    2. Pharyngitis, unspecified etiology    Other orders  - " Phenylephrine-Pheniramine-DM (THERAFLU COLD & COUGH) 10-20-20 MG Pack; Take  by mouth.  - Pseudoephedrine-APAP-DM (DAYQUIL PO); Take  by mouth.  -Flonase nasal spray OTC  -Sinus flushes using distilled water and saline  -Warm salt water gargles for sore throat  -Monitor symptoms and return to clinic if symptoms worsen or fail to resolve    MDM/Comments:  Patient has stable vital signs and is non-toxic appearing.  Viral testing for COVID, influenza, and RSV performed in office with positive RSV results.  Patient has politely declined strep testing at this time.  Discussed use of Flonase nasal spray and sinus flushes. Discussed supportive care with hydration, rest, Tylenol/Ibuprofen as needed. Patient demonstrated understanding of treatment plan at this time and will RTC if symptoms worsen or fail to resolve.     Differential diagnosis, natural history, supportive care, and indications for immediate follow-up discussed. All questions answered. Patient agrees with the plan of care.    Follow-up as needed if symptoms worsen or fail to improve to PCP, Urgent care or Emergency Room.    I have personally reviewed prior external notes and test results pertinent to today's visit.  I have independently reviewed and interpreted all diagnostics ordered during this urgent care acute visit.   Discussed management options (risks,benefits, and alternatives to treatment). Pt expresses understanding and the treatment plan was agreed upon. Questions were encouraged and answered to pt's satisfaction.    Please note that this dictation was created using voice recognition software. I have made a reasonable attempt to correct obvious errors, but I expect that there are errors of grammar and possibly content that I did not discover before finalizing the note.

## 2024-11-21 NOTE — LETTER
November 21, 2024    To Whom It May Concern:         This is confirmation that Leeann Kamara attended her scheduled appointment with Lona Mcmanus P.A.-C. on 11/21/24. Please allow her to work remote from home tomorrow. She may return to work in person in 2-3 days if feeling well.          If you have any questions please do not hesitate to call me at the phone number listed below.    Sincerely,          Lona Mcmanus P.A.-C.  [phone]